# Patient Record
Sex: MALE | Race: WHITE | NOT HISPANIC OR LATINO | Employment: UNEMPLOYED | URBAN - METROPOLITAN AREA
[De-identification: names, ages, dates, MRNs, and addresses within clinical notes are randomized per-mention and may not be internally consistent; named-entity substitution may affect disease eponyms.]

---

## 2020-08-30 ENCOUNTER — WALK IN (OUTPATIENT)
Dept: URGENT CARE | Age: 55
End: 2020-08-30

## 2020-08-30 VITALS
OXYGEN SATURATION: 97 % | SYSTOLIC BLOOD PRESSURE: 130 MMHG | TEMPERATURE: 98.5 F | HEIGHT: 76 IN | DIASTOLIC BLOOD PRESSURE: 76 MMHG | HEART RATE: 64 BPM | BODY MASS INDEX: 28.01 KG/M2 | RESPIRATION RATE: 16 BRPM | WEIGHT: 230 LBS

## 2020-08-30 DIAGNOSIS — J02.9 SORE THROAT: ICD-10-CM

## 2020-08-30 DIAGNOSIS — K12.2 UVULITIS: Primary | ICD-10-CM

## 2020-08-30 PROCEDURE — 99204 OFFICE O/P NEW MOD 45 MIN: CPT | Performed by: INTERNAL MEDICINE

## 2020-08-30 RX ORDER — PREDNISONE 20 MG/1
40 TABLET ORAL DAILY
Qty: 10 TABLET | Refills: 0 | Status: SHIPPED | OUTPATIENT
Start: 2020-08-30 | End: 2020-09-04

## 2020-08-30 RX ORDER — LORATADINE 10 MG/1
10 TABLET ORAL DAILY
Qty: 30 TABLET | Refills: 0 | Status: SHIPPED | OUTPATIENT
Start: 2020-08-30

## 2020-08-30 RX ORDER — EVOLOCUMAB 140 MG/ML
INJECTION, SOLUTION SUBCUTANEOUS
COMMUNITY
Start: 2020-08-19

## 2020-08-30 RX ORDER — ROSUVASTATIN CALCIUM 40 MG/1
40 TABLET, COATED ORAL DAILY
COMMUNITY
Start: 2020-06-18

## 2020-08-30 RX ORDER — PRASUGREL 10 MG/1
10 TABLET, FILM COATED ORAL DAILY
COMMUNITY
Start: 2020-05-27

## 2020-08-30 ASSESSMENT — ENCOUNTER SYMPTOMS
FACIAL SWELLING: 0
EYE PAIN: 0
EYE DISCHARGE: 0
EYE ITCHING: 0
CONSTITUTIONAL NEGATIVE: 1
EYE REDNESS: 0
SORE THROAT: 0
NEUROLOGICAL NEGATIVE: 1
VOICE CHANGE: 0
GASTROINTESTINAL NEGATIVE: 1
SINUS PAIN: 0
RHINORRHEA: 0
TROUBLE SWALLOWING: 0
RESPIRATORY NEGATIVE: 1
SINUS PRESSURE: 0

## 2020-11-19 PROBLEM — Z00.00 ENCOUNTER FOR PREVENTIVE HEALTH EXAMINATION: Status: ACTIVE | Noted: 2020-11-19

## 2020-11-30 ENCOUNTER — APPOINTMENT (OUTPATIENT)
Dept: OTOLARYNGOLOGY | Facility: CLINIC | Age: 55
End: 2020-11-30
Payer: COMMERCIAL

## 2020-11-30 DIAGNOSIS — Z78.9 OTHER SPECIFIED HEALTH STATUS: ICD-10-CM

## 2020-11-30 PROCEDURE — 92567 TYMPANOMETRY: CPT

## 2020-11-30 PROCEDURE — 99243 OFF/OP CNSLTJ NEW/EST LOW 30: CPT | Mod: 25

## 2020-11-30 PROCEDURE — 99072 ADDL SUPL MATRL&STAF TM PHE: CPT

## 2020-11-30 PROCEDURE — 92557 COMPREHENSIVE HEARING TEST: CPT

## 2020-11-30 RX ORDER — PREDNISONE 20 MG/1
20 TABLET ORAL
Qty: 10 | Refills: 0 | Status: DISCONTINUED | COMMUNITY
Start: 2020-08-30

## 2020-11-30 RX ORDER — PRASUGREL HYDROCHLORIDE 10 MG/1
TABLET, COATED ORAL
Refills: 0 | Status: ACTIVE | COMMUNITY

## 2020-11-30 RX ORDER — METHYLPREDNISOLONE 4 MG/1
4 TABLET ORAL
Qty: 21 | Refills: 0 | Status: DISCONTINUED | COMMUNITY
Start: 2020-11-06

## 2020-11-30 RX ORDER — CIPROFLOXACIN HYDROCHLORIDE 500 MG/1
500 TABLET, FILM COATED ORAL
Qty: 14 | Refills: 0 | Status: DISCONTINUED | COMMUNITY
Start: 2020-11-06

## 2020-11-30 RX ORDER — CIPROFLOXACIN AND DEXAMETHASONE 3; 1 MG/ML; MG/ML
0.3-0.1 SUSPENSION/ DROPS AURICULAR (OTIC)
Qty: 8 | Refills: 0 | Status: DISCONTINUED | COMMUNITY
Start: 2020-11-06

## 2020-11-30 RX ORDER — EVOLOCUMAB 140 MG/ML
140 INJECTION, SOLUTION SUBCUTANEOUS
Refills: 0 | Status: ACTIVE | COMMUNITY

## 2020-11-30 RX ORDER — EVOLOCUMAB 140 MG/ML
140 INJECTION, SOLUTION SUBCUTANEOUS
Qty: 6 | Refills: 0 | Status: DISCONTINUED | COMMUNITY
Start: 2020-11-10

## 2020-11-30 RX ORDER — ROSUVASTATIN CALCIUM 40 MG/1
40 TABLET, FILM COATED ORAL
Qty: 90 | Refills: 0 | Status: ACTIVE | COMMUNITY
Start: 2020-06-18

## 2020-11-30 RX ORDER — ROSUVASTATIN CALCIUM 5 MG/1
TABLET, FILM COATED ORAL
Refills: 0 | Status: DISCONTINUED | COMMUNITY
End: 2020-11-30

## 2020-11-30 RX ORDER — PRASUGREL 10 MG/1
10 TABLET, FILM COATED ORAL
Qty: 90 | Refills: 0 | Status: DISCONTINUED | COMMUNITY
Start: 2020-09-30

## 2020-12-01 NOTE — PHYSICAL EXAM
[Midline] : trachea located in midline position [Normal] : no rashes [de-identified] : right 80% obstruction with exostoses left 30%

## 2020-12-01 NOTE — DATA REVIEWED
[de-identified] : Right- -mild to moderate mixed HL\par Left- -mild to moderate mixed hearing loss after 2000Hz\par Impedance testing reveals normal Type A tympanograms bilaterally\par

## 2020-12-01 NOTE — CONSULT LETTER
[Dear  ___] : Dear  [unfilled], [Consult Letter:] : I had the pleasure of evaluating your patient, [unfilled]. [Please see my note below.] : Please see my note below. [Consult Closing:] : Thank you very much for allowing me to participate in the care of this patient.  If you have any questions, please do not hesitate to contact me. [Sincerely,] : Sincerely, [FreeTextEntry2] : Quin Tripathi MD [FreeTextEntry3] : Russell Wills MD, FACS\par Chair of Otolaryngology, [facility]\par Professor of Otolaryngology & Molecular Medicine, [employer] at [facility]\par \par

## 2020-12-01 NOTE — REASON FOR VISIT
[Initial Consultation] : an initial consultation for [Hearing Loss] : hearing loss [FreeTextEntry2] : referred by Marybeth Chavarria (PA) for right exostosis with HL

## 2020-12-01 NOTE — HISTORY OF PRESENT ILLNESS
[de-identified] : 55M referred by Marybeth Chavarria (PA at office of Dr. Quin Tripathi) for right exostosis with HL- pt notes left exostosis as well but its not presenting a problem- pt notes the canal is narrowing and causing infections - last infection 1 week ago- pt notes he swims a lot- gets 1-2 infections/year. Pt has not had any CT scan done. Pt denies otalgia, otorrhea, tinnitus.  No pain but "cant get water out" .

## 2021-12-06 ENCOUNTER — APPOINTMENT (OUTPATIENT)
Dept: OTOLARYNGOLOGY | Facility: CLINIC | Age: 56
End: 2021-12-06

## 2021-12-10 ENCOUNTER — APPOINTMENT (OUTPATIENT)
Dept: OTOLARYNGOLOGY | Facility: CLINIC | Age: 56
End: 2021-12-10
Payer: COMMERCIAL

## 2021-12-10 VITALS
HEIGHT: 76 IN | TEMPERATURE: 97.5 F | WEIGHT: 228 LBS | HEART RATE: 62 BPM | DIASTOLIC BLOOD PRESSURE: 86 MMHG | SYSTOLIC BLOOD PRESSURE: 148 MMHG | BODY MASS INDEX: 27.76 KG/M2

## 2021-12-10 DIAGNOSIS — R49.0 DYSPHONIA: ICD-10-CM

## 2021-12-10 DIAGNOSIS — H61.23 IMPACTED CERUMEN, BILATERAL: ICD-10-CM

## 2021-12-10 DIAGNOSIS — R09.82 POSTNASAL DRIP: ICD-10-CM

## 2021-12-10 PROCEDURE — 99214 OFFICE O/P EST MOD 30 MIN: CPT | Mod: 25

## 2021-12-10 PROCEDURE — 69210 REMOVE IMPACTED EAR WAX UNI: CPT

## 2021-12-10 PROCEDURE — 31575 DIAGNOSTIC LARYNGOSCOPY: CPT

## 2021-12-10 NOTE — END OF VISIT
[FreeTextEntry3] : I saw and examined this patient in person. I have discussed with Radha Mora, Physician Assistant, in detail the above note and agree with the above assessment and plan of care.\par

## 2021-12-10 NOTE — CONSULT LETTER
[Dear  ___] : Dear  [unfilled], [Consult Letter:] : I had the pleasure of evaluating your patient, [unfilled]. [Please see my note below.] : Please see my note below. [Consult Closing:] : Thank you very much for allowing me to participate in the care of this patient.  If you have any questions, please do not hesitate to contact me. [Sincerely,] : Sincerely, [FreeTextEntry3] : Claudio Vigil MD\par [facility]\par Otolaryngology and Facial Plastics\par Associated Professor, [employer]\par

## 2021-12-10 NOTE — ASSESSMENT
[FreeTextEntry1] : Patient with a history of exostosis been complaining of clearing his throat for some time is here for evaluation on examination he had cerumen that was curetted out significant exostosis bilaterally nasal endoscopy shows a deviated septum the left fiberoptic evaluation of his larynx showed no tumors or masses he was reassured which he was anxious about because of a friend of his just had throat cancer put him on a Medrol Dosepak and Flonase nasal spray that should help him with his postnasal drip and thick mucus and he'll follow-up and see us in 2 months.

## 2021-12-10 NOTE — PHYSICAL EXAM
[Midline] : trachea located in midline position [Normal] : no rashes [Nasal Endoscopy Performed] : nasal endoscopy was performed, see procedure section for findings [] : septum deviated to the right [de-identified] : exostoses in the bilateral ear canals

## 2021-12-10 NOTE — REVIEW OF SYSTEMS
[Negative] : Heme/Lymph [Post Nasal Drip] : post nasal drip [Hoarseness] : hoarseness [Throat Clearing] : throat clearing [Throat Dryness] : throat dryness

## 2021-12-10 NOTE — HISTORY OF PRESENT ILLNESS
[de-identified] : Patient had a constant need to clear his throat and cough up some mucus for at least a year. He has a scratchy sensation in the throat as if there is dryness and a hoarseness to his voice. He does not have a significant history of acid reflux. He denies seasonal allergies or postnasal drip. He states that he has  something called "surfers ear", or due to cold water exposure he has a bony growth in the ear canal.

## 2021-12-13 PROBLEM — H61.23 EXCESSIVE EAR WAX, BILATERAL: Status: ACTIVE | Noted: 2021-12-13

## 2022-02-28 ENCOUNTER — APPOINTMENT (OUTPATIENT)
Dept: OTOLARYNGOLOGY | Facility: CLINIC | Age: 57
End: 2022-02-28

## 2022-09-26 ENCOUNTER — APPOINTMENT (OUTPATIENT)
Dept: OTOLARYNGOLOGY | Facility: CLINIC | Age: 57
End: 2022-09-26

## 2022-09-26 VITALS
WEIGHT: 230 LBS | SYSTOLIC BLOOD PRESSURE: 131 MMHG | HEIGHT: 76 IN | DIASTOLIC BLOOD PRESSURE: 89 MMHG | HEART RATE: 70 BPM | BODY MASS INDEX: 28.01 KG/M2

## 2022-09-26 PROCEDURE — 92567 TYMPANOMETRY: CPT

## 2022-09-26 PROCEDURE — 99214 OFFICE O/P EST MOD 30 MIN: CPT

## 2022-09-26 PROCEDURE — 92557 COMPREHENSIVE HEARING TEST: CPT

## 2022-09-26 RX ORDER — LORATADINE 10 MG/1
10 TABLET ORAL
Qty: 30 | Refills: 0 | Status: COMPLETED | COMMUNITY
Start: 2020-08-30 | End: 2022-09-26

## 2022-09-26 RX ORDER — METHYLPREDNISOLONE 4 MG/1
4 TABLET ORAL
Qty: 21 | Refills: 0 | Status: COMPLETED | COMMUNITY
Start: 2021-12-10 | End: 2022-09-26

## 2022-09-26 RX ORDER — ASPIRIN 81 MG
81 TABLET, DELAYED RELEASE (ENTERIC COATED) ORAL
Refills: 0 | Status: ACTIVE | COMMUNITY

## 2022-09-26 NOTE — PHYSICAL EXAM
[Midline] : trachea located in midline position [Normal] : no rashes [de-identified] : right 80% obstruction with exostoses left 30%

## 2022-09-26 NOTE — DATA REVIEWED
[de-identified] : Right - mild sensorineural hearing loss after 2000Hz\par Left - mild to moderate "notched" sensorineural hearing loss after 2000Hz\par Impedance testing reveals normal Type A tympanograms bilaterally\par
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
## 2022-09-26 NOTE — REASON FOR VISIT
[Subsequent Evaluation] : a subsequent evaluation for [FreeTextEntry2] : exostosis with HL and hearing loss.

## 2022-10-04 ENCOUNTER — TRANSCRIPTION ENCOUNTER (OUTPATIENT)
Age: 57
End: 2022-10-04

## 2022-10-04 ENCOUNTER — OUTPATIENT (OUTPATIENT)
Dept: OUTPATIENT SERVICES | Facility: HOSPITAL | Age: 57
LOS: 1 days | End: 2022-10-04

## 2022-10-04 ENCOUNTER — APPOINTMENT (OUTPATIENT)
Dept: CT IMAGING | Facility: CLINIC | Age: 57
End: 2022-10-04

## 2022-10-04 PROCEDURE — 70480 CT ORBIT/EAR/FOSSA W/O DYE: CPT | Mod: 26

## 2022-12-12 ENCOUNTER — APPOINTMENT (OUTPATIENT)
Dept: OTOLARYNGOLOGY | Facility: CLINIC | Age: 57
End: 2022-12-12

## 2022-12-12 VITALS
DIASTOLIC BLOOD PRESSURE: 94 MMHG | HEART RATE: 71 BPM | SYSTOLIC BLOOD PRESSURE: 154 MMHG | HEIGHT: 76 IN | WEIGHT: 230 LBS | BODY MASS INDEX: 28.01 KG/M2

## 2022-12-12 DIAGNOSIS — H61.813 EXOSTOSIS OF EXTERNAL CANAL, BILATERAL: ICD-10-CM

## 2022-12-12 DIAGNOSIS — H61.303 ACQUIRED STENOSIS OF EXTERNAL EAR CANAL, UNSPECIFIED, BILATERAL: ICD-10-CM

## 2022-12-12 DIAGNOSIS — H90.6 MIXED CONDUCTIVE AND SENSORINEURAL HEARING LOSS, BILATERAL: ICD-10-CM

## 2022-12-12 PROCEDURE — 99213 OFFICE O/P EST LOW 20 MIN: CPT

## 2022-12-12 RX ORDER — FLUTICASONE PROPIONATE 50 UG/1
50 SPRAY, METERED NASAL DAILY
Qty: 1 | Refills: 3 | Status: COMPLETED | COMMUNITY
Start: 2021-12-10 | End: 2022-12-12

## 2022-12-12 RX ORDER — OXYCODONE AND ACETAMINOPHEN 5; 325 MG/1; MG/1
5-325 TABLET ORAL
Qty: 10 | Refills: 0 | Status: COMPLETED | COMMUNITY
Start: 2022-07-03

## 2022-12-12 RX ORDER — OFLOXACIN OTIC 3 MG/ML
0.3 SOLUTION AURICULAR (OTIC)
Qty: 5 | Refills: 0 | Status: DISCONTINUED | COMMUNITY
Start: 2022-09-13

## 2022-12-16 ENCOUNTER — OUTPATIENT (OUTPATIENT)
Dept: OUTPATIENT SERVICES | Facility: HOSPITAL | Age: 57
LOS: 1 days | End: 2022-12-16

## 2022-12-16 VITALS
WEIGHT: 229.94 LBS | HEART RATE: 75 BPM | DIASTOLIC BLOOD PRESSURE: 86 MMHG | HEIGHT: 75.5 IN | RESPIRATION RATE: 16 BRPM | SYSTOLIC BLOOD PRESSURE: 132 MMHG | OXYGEN SATURATION: 98 % | TEMPERATURE: 97 F

## 2022-12-16 DIAGNOSIS — H90.6 MIXED CONDUCTIVE AND SENSORINEURAL HEARING LOSS, BILATERAL: ICD-10-CM

## 2022-12-16 DIAGNOSIS — Z98.890 OTHER SPECIFIED POSTPROCEDURAL STATES: Chronic | ICD-10-CM

## 2022-12-16 DIAGNOSIS — H61.813 EXOSTOSIS OF EXTERNAL CANAL, BILATERAL: ICD-10-CM

## 2022-12-16 DIAGNOSIS — H91.90 UNSPECIFIED HEARING LOSS, UNSPECIFIED EAR: ICD-10-CM

## 2022-12-16 LAB
ANION GAP SERPL CALC-SCNC: 11 MMOL/L — SIGNIFICANT CHANGE UP (ref 7–14)
BUN SERPL-MCNC: 11 MG/DL — SIGNIFICANT CHANGE UP (ref 7–23)
CALCIUM SERPL-MCNC: 9.5 MG/DL — SIGNIFICANT CHANGE UP (ref 8.4–10.5)
CHLORIDE SERPL-SCNC: 101 MMOL/L — SIGNIFICANT CHANGE UP (ref 98–107)
CO2 SERPL-SCNC: 29 MMOL/L — SIGNIFICANT CHANGE UP (ref 22–31)
CREAT SERPL-MCNC: 0.8 MG/DL — SIGNIFICANT CHANGE UP (ref 0.5–1.3)
EGFR: 102 ML/MIN/1.73M2 — SIGNIFICANT CHANGE UP
GLUCOSE SERPL-MCNC: 97 MG/DL — SIGNIFICANT CHANGE UP (ref 70–99)
HCT VFR BLD CALC: 44.7 % — SIGNIFICANT CHANGE UP (ref 39–50)
HGB BLD-MCNC: 15.3 G/DL — SIGNIFICANT CHANGE UP (ref 13–17)
MCHC RBC-ENTMCNC: 30.3 PG — SIGNIFICANT CHANGE UP (ref 27–34)
MCHC RBC-ENTMCNC: 34.2 GM/DL — SIGNIFICANT CHANGE UP (ref 32–36)
MCV RBC AUTO: 88.5 FL — SIGNIFICANT CHANGE UP (ref 80–100)
NRBC # BLD: 0 /100 WBCS — SIGNIFICANT CHANGE UP (ref 0–0)
NRBC # FLD: 0 K/UL — SIGNIFICANT CHANGE UP (ref 0–0)
PLATELET # BLD AUTO: 218 K/UL — SIGNIFICANT CHANGE UP (ref 150–400)
POTASSIUM SERPL-MCNC: 4.5 MMOL/L — SIGNIFICANT CHANGE UP (ref 3.5–5.3)
POTASSIUM SERPL-SCNC: 4.5 MMOL/L — SIGNIFICANT CHANGE UP (ref 3.5–5.3)
RBC # BLD: 5.05 M/UL — SIGNIFICANT CHANGE UP (ref 4.2–5.8)
RBC # FLD: 12.8 % — SIGNIFICANT CHANGE UP (ref 10.3–14.5)
SODIUM SERPL-SCNC: 141 MMOL/L — SIGNIFICANT CHANGE UP (ref 135–145)
WBC # BLD: 5.31 K/UL — SIGNIFICANT CHANGE UP (ref 3.8–10.5)
WBC # FLD AUTO: 5.31 K/UL — SIGNIFICANT CHANGE UP (ref 3.8–10.5)

## 2022-12-16 PROCEDURE — 93010 ELECTROCARDIOGRAM REPORT: CPT

## 2022-12-16 RX ORDER — SODIUM CHLORIDE 9 MG/ML
1000 INJECTION, SOLUTION INTRAVENOUS
Refills: 0 | Status: DISCONTINUED | OUTPATIENT
Start: 2022-12-22 | End: 2023-01-05

## 2022-12-16 NOTE — H&P PST ADULT - HISTORY OF PRESENT ILLNESS
Pt is a 57 yr old male scheduled for Right Removal of Exostoese with Split thickness Skin Graft From Right Arm and facial Nerve Monitoring with Dr Becerra tentatively 12/22/22 - pt hx b/l hearing loss / numerous ear infections - now for surgery to correct. Pt hx CAD - 11 stents placed - most recently 1 stent placed 4/22 - pt on Effient - hx HLD- AA 8/22 - pt c/o of mild restriction to cervical neck ROM   Pt given information for COVID PCR testing sites and told to make appointment 3-5 days preop  Pt is a 57 yr old male scheduled for Right Removal of Exostoese with Split thickness Skin Graft From Right Arm and facial Nerve Monitoring with Dr Becerra tentatively 12/22/22 - pt hx b/l hearing loss / numerous ear infections in past  - now for surgery to correct. Pt hx CAD - 11 stents placed - most recently 1 stent placed 4/22 - pt on Effient - hx HLD- AA 8/22 - pt c/o of mild restriction to cervical neck ROM   Pt given information for COVID PCR testing sites and told to make appointment 3-5 days preop   Pt did not have any stent info - CC to be requested

## 2022-12-16 NOTE — H&P PST ADULT - PROBLEM SELECTOR PLAN 1
Pt scheduled for surgery and preop instructions including instructions for taking Famotidine  on the day of surgery, given verbally and with use of  written materials, and patient confirming understanding of such instructions using  teach back method.  Pt given information for COVID PCR testing sites and told to make appointment 3-5 days preop   Request CC and report from cath 4/22 - effient preop instructions to stop 5 days preop   Email to Dr Becerra to inform of same

## 2022-12-16 NOTE — H&P PST ADULT - NSICDXPASTMEDICALHX_GEN_ALL_CORE_FT
PAST MEDICAL HISTORY:  CAD (coronary artery disease)     Hearing loss      PAST MEDICAL HISTORY:  CAD (coronary artery disease)     Cervical disc disorder     Hearing loss

## 2022-12-18 PROBLEM — H61.813 EXOSTOSIS OF BOTH EXTERNAL AUDITORY CANALS: Status: ACTIVE | Noted: 2020-12-01

## 2022-12-18 PROBLEM — H61.303 ACQUIRED STENOSIS OF BOTH EXTERNAL EAR CANALS: Status: ACTIVE | Noted: 2020-12-01

## 2022-12-18 PROBLEM — H90.6 MIXED CONDUCTIVE AND SENSORINEURAL HEARING LOSS OF BOTH EARS: Status: ACTIVE | Noted: 2020-12-01

## 2022-12-18 NOTE — PHYSICAL EXAM
[Midline] : trachea located in midline position [Normal] : no rashes [de-identified] : right 80% obstruction with exostoses left 30%
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
## 2022-12-21 ENCOUNTER — TRANSCRIPTION ENCOUNTER (OUTPATIENT)
Age: 57
End: 2022-12-21

## 2022-12-21 NOTE — ASU PATIENT PROFILE, ADULT - FALL HARM RISK - UNIVERSAL INTERVENTIONS
Bed in lowest position, wheels locked, appropriate side rails in place/Call bell, personal items and telephone in reach/Instruct patient to call for assistance before getting out of bed or chair/Non-slip footwear when patient is out of bed/[place] to call system/Physically safe environment - no spills, clutter or unnecessary equipment/Purposeful Proactive Rounding/Room/bathroom lighting operational, light cord in reach

## 2022-12-22 ENCOUNTER — OUTPATIENT (OUTPATIENT)
Dept: OUTPATIENT SERVICES | Facility: HOSPITAL | Age: 57
LOS: 1 days | Discharge: HOME | End: 2022-12-22
Payer: COMMERCIAL

## 2022-12-22 ENCOUNTER — RESULT REVIEW (OUTPATIENT)
Age: 57
End: 2022-12-22

## 2022-12-22 ENCOUNTER — TRANSCRIPTION ENCOUNTER (OUTPATIENT)
Age: 57
End: 2022-12-22

## 2022-12-22 ENCOUNTER — APPOINTMENT (OUTPATIENT)
Dept: OTOLARYNGOLOGY | Facility: HOSPITAL | Age: 57
End: 2022-12-22

## 2022-12-22 VITALS
HEART RATE: 67 BPM | RESPIRATION RATE: 17 BRPM | DIASTOLIC BLOOD PRESSURE: 69 MMHG | SYSTOLIC BLOOD PRESSURE: 113 MMHG | OXYGEN SATURATION: 100 %

## 2022-12-22 VITALS
HEART RATE: 60 BPM | SYSTOLIC BLOOD PRESSURE: 118 MMHG | TEMPERATURE: 98 F | DIASTOLIC BLOOD PRESSURE: 79 MMHG | OXYGEN SATURATION: 100 % | RESPIRATION RATE: 18 BRPM | HEIGHT: 75.5 IN | WEIGHT: 229.94 LBS

## 2022-12-22 DIAGNOSIS — H90.6 MIXED CONDUCTIVE AND SENSORINEURAL HEARING LOSS, BILATERAL: ICD-10-CM

## 2022-12-22 DIAGNOSIS — Z98.890 OTHER SPECIFIED POSTPROCEDURAL STATES: Chronic | ICD-10-CM

## 2022-12-22 PROCEDURE — 88304 TISSUE EXAM BY PATHOLOGIST: CPT | Mod: 26

## 2022-12-22 PROCEDURE — 88311 DECALCIFY TISSUE: CPT | Mod: 26

## 2022-12-22 PROCEDURE — 92516 FACIAL NERVE FUNCTION TEST: CPT

## 2022-12-22 PROCEDURE — 69140 REMOVE EAR CANAL LESION(S): CPT | Mod: RT

## 2022-12-22 DEVICE — BONE WAX 2.5GM: Type: IMPLANTABLE DEVICE | Site: RIGHT | Status: FUNCTIONAL

## 2022-12-22 DEVICE — SURGIFOAM PAD 8CM X 12.5CM X 2MM (100C): Type: IMPLANTABLE DEVICE | Site: RIGHT | Status: FUNCTIONAL

## 2022-12-22 RX ORDER — AZITHROMYCIN 500 MG/1
1 TABLET, FILM COATED ORAL
Qty: 10 | Refills: 0
Start: 2022-12-22 | End: 2022-12-31

## 2022-12-22 RX ORDER — ACETAMINOPHEN WITH CODEINE 300MG-30MG
1 TABLET ORAL
Qty: 30 | Refills: 0
Start: 2022-12-22

## 2022-12-22 RX ORDER — ROSUVASTATIN CALCIUM 5 MG/1
1 TABLET ORAL
Qty: 0 | Refills: 0 | DISCHARGE

## 2022-12-22 RX ORDER — FENTANYL CITRATE 50 UG/ML
50 INJECTION INTRAVENOUS
Refills: 0 | Status: DISCONTINUED | OUTPATIENT
Start: 2022-12-22 | End: 2022-12-22

## 2022-12-22 RX ORDER — PRASUGREL 5 MG/1
1 TABLET, FILM COATED ORAL
Qty: 0 | Refills: 0 | DISCHARGE

## 2022-12-22 RX ORDER — ASPIRIN/CALCIUM CARB/MAGNESIUM 324 MG
0 TABLET ORAL
Qty: 0 | Refills: 0 | DISCHARGE

## 2022-12-22 RX ORDER — EVOLOCUMAB 140 MG/ML
0 INJECTION, SOLUTION SUBCUTANEOUS
Qty: 0 | Refills: 0 | DISCHARGE

## 2022-12-22 RX ORDER — OFLOXACIN 200 MG
3 TABLET ORAL
Qty: 15 | Refills: 0
Start: 2022-12-22

## 2022-12-22 RX ORDER — ONDANSETRON 8 MG/1
4 TABLET, FILM COATED ORAL ONCE
Refills: 0 | Status: DISCONTINUED | OUTPATIENT
Start: 2022-12-22 | End: 2023-01-05

## 2022-12-22 RX ORDER — FENTANYL CITRATE 50 UG/ML
25 INJECTION INTRAVENOUS
Refills: 0 | Status: DISCONTINUED | OUTPATIENT
Start: 2022-12-22 | End: 2022-12-22

## 2022-12-22 RX ADMIN — FENTANYL CITRATE 50 MICROGRAM(S): 50 INJECTION INTRAVENOUS at 12:45

## 2022-12-22 RX ADMIN — FENTANYL CITRATE 50 MICROGRAM(S): 50 INJECTION INTRAVENOUS at 12:35

## 2022-12-22 NOTE — ASU DISCHARGE PLAN (ADULT/PEDIATRIC) - ASU DC SPECIAL INSTRUCTIONSFT
Tylenol #3 as needed for pain    Starting azithromycin (antibiotic) for 10 days    Dry ear precautions    In 1 week after surgery, start ofloxacin ear drops, 3 drops to right ear once a day

## 2022-12-22 NOTE — ASU DISCHARGE PLAN (ADULT/PEDIATRIC) - NS MD DC FALL RISK RISK
For information on Fall & Injury Prevention, visit: [email or web address] OR  [email or web address] OR  https://www.cdc.gov/steadi/patient.html

## 2022-12-22 NOTE — BRIEF OPERATIVE NOTE - OPERATION/FINDINGS
s/p right exostosis removal, canalplasty, split thickness skin graft s/p right exostosis removal, canalplasty

## 2022-12-22 NOTE — ASU DISCHARGE PLAN (ADULT/PEDIATRIC) - POST OP PHONE #
[phone] pt. granted permission to leave message /and or speak with whoever answers the phone.

## 2022-12-28 ENCOUNTER — NON-APPOINTMENT (OUTPATIENT)
Age: 57
End: 2022-12-28

## 2022-12-28 ENCOUNTER — APPOINTMENT (OUTPATIENT)
Dept: OTOLARYNGOLOGY | Facility: CLINIC | Age: 57
End: 2022-12-28

## 2022-12-29 LAB — SURGICAL PATHOLOGY STUDY: SIGNIFICANT CHANGE UP

## 2023-11-09 NOTE — H&P PST ADULT - CARDIOVASCULAR COMMENTS
If you develop chest pain, shortness of breath, worsening symptoms go to emergency department for evaluation.
Pt FHX early CAD - pt has had CAD stents x 11 with most recent 4/11 - pt on Effient to stop 5 days preop

## 2024-08-27 NOTE — ASU DISCHARGE PLAN (ADULT/PEDIATRIC) - CARE PROVIDER_API CALL
Russell Wills)  Otolaryngology  [address] [place] [address]  Phone: [phone]  Fax: [phone]  Follow Up Time:   
no

## (undated) DEVICE — LABELS BLANK W PEN

## (undated) DEVICE — DRSG CURITY GAUZE SPONGE 4 X 4" 12-PLY

## (undated) DEVICE — DRAPE INSTRUMENT POUCH 6.75" X 11"

## (undated) DEVICE — SOL IRR POUR H2O 500ML

## (undated) DEVICE — TUBING SUCTION NONCONDUCTIVE 6MM X 12FT

## (undated) DEVICE — SYR LUER LOK 10CC

## (undated) DEVICE — FOLEY CATH 2-WAY 22FR 5CC UNCOATED SILICONE

## (undated) DEVICE — DRSG PELLET

## (undated) DEVICE — ZIMMER BLADE DERMATONE

## (undated) DEVICE — CANISTER DISPOSABLE THIN WALL 3000CC

## (undated) DEVICE — ELCTR BOVIE TIP BLADE INSULATED 2.75" EDGE

## (undated) DEVICE — POSITIONER PINK PAD PIGAZZI SYSTEM

## (undated) DEVICE — SUT MONOCRYL 4-0 18" P-3 UNDYED

## (undated) DEVICE — DRSG MASTISOL

## (undated) DEVICE — DRAPE MAYO STAND 30"

## (undated) DEVICE — DRAPE TOWEL BLUE 17" X 24"

## (undated) DEVICE — PACK MASTOID/MIDDLE EAR

## (undated) DEVICE — ELCTR GROUNDING PAD ADULT COVIDIEN

## (undated) DEVICE — COTTONBALL LG

## (undated) DEVICE — DRAPE MICROSCOPE OPMI VISIONGUARD 48X118"

## (undated) DEVICE — SOL IRR POUR NS 0.9% 500ML

## (undated) DEVICE — DRSG STERISTRIPS 0.5 X 4"

## (undated) DEVICE — DRSG TELFA 3 X 8

## (undated) DEVICE — SUT PLAIN GUT FAST ABSORBING 5-0 PC-1

## (undated) DEVICE — ELCTR MONOPOLAR STIMULATOR PROBE FLUSH-TIP

## (undated) DEVICE — VENODYNE/SCD SLEEVE CALF MEDIUM

## (undated) DEVICE — DRAPE 1/2 SHEET 40X57"

## (undated) DEVICE — CONN FEMALE LUER ADAPTOR SM XMAS TREE

## (undated) DEVICE — DRAPE TOWEL WHITE 15.5" X 22"

## (undated) DEVICE — GLV 7 PROTEXIS (WHITE)

## (undated) DEVICE — DRAPE MICROSCOPE ZEISS W CLEARLENS

## (undated) DEVICE — SUT CHROMIC 3-0 27" RB-1

## (undated) DEVICE — Device

## (undated) DEVICE — WARMING BLANKET FULL ADULT

## (undated) DEVICE — KNIFE MYRINGOTOMY ARROW

## (undated) DEVICE — ELCTR NDL SUBDERMAL 2 CHANNEL

## (undated) DEVICE — MARKING PEN W RULER

## (undated) DEVICE — DRAPE 3/4 SHEET 52X76"

## (undated) DEVICE — DRAPE SPLIT SHEET 77" X 120"

## (undated) DEVICE — DRAIN PENROSE .5" X 18" LATEX